# Patient Record
(demographics unavailable — no encounter records)

---

## 2025-03-22 NOTE — HISTORY OF PRESENT ILLNESS
[Mother] : mother [Cow's milk (Ounces per day ___)] : consumes [unfilled] oz of cow's milk per day [Fruit] : fruit [Vegetables] : vegetables [Meat] : meat [Cereal] : cereal [Eggs] : eggs [Table food] : table food [Normal] : Normal [Brushing teeth] : Brushing teeth [Toothpaste] : Primary Fluoride Source: Toothpaste [Playtime] : Playtime [No] : No cigarette smoke exposure [Water heater temperature set at <120 degrees F] : Water heater temperature set at <120 degrees F [Car seat in back seat] : Car seat in back seat [Carbon Monoxide Detectors] : Carbon monoxide detectors [Smoke Detectors] : Smoke detectors [FreeTextEntry7] : 15 month [facility] visit  [de-identified] : Dry skin on face arms and legs

## 2025-03-22 NOTE — DEVELOPMENTAL MILESTONES
[Normal Development] : Normal Development [Imitates scribbling] : imitates scribbling [Drinks from cup with little] : drinks from cup with little spilling [Uses 3 words other than names] : uses 3 words other than names [Speaks in sounds that seem like] : speaks in sounds that seem like an unknown language [Follows directions that do not] : follows direction that do not include a gesture [Looks when parent says,] : looks when parent says, "Where is...?" [Squats to  objects] : squats to  objects [Crawls up a few steps] : crawls up a few steps [Begins to run] : begins to run [Drops object into and takes object] : drops object into and takes object out of container

## 2025-03-22 NOTE — DISCUSSION/SUMMARY
[Communication and Social Development] : communication and social development [Sleep Routines and Issues] : sleep routines and issues [Temper Tantrums and Discipline] : temper tantrums and discipline [Healthy Teeth] : healthy teeth [Safety] : safety [] : The components of the vaccine(s) to be administered today are listed in the plan of care. The disease(s) for which the vaccine(s) are intended to prevent and the risks have been discussed with the caretaker.  The risks are also included in the appropriate vaccination information statements which have been provided to the patient's caregiver.  The caregiver has given consent to vaccinate. [FreeTextEntry1] : 15 month old for WCC visit. Well appearing child on exam in no acute distress. Age-appropriate anticipatory guidance provided.   -Vaccines as ordered. Flu vaccine declined today by mother  -RTC for next WCC visit/sooner if any concerns arise

## 2025-03-22 NOTE — PHYSICAL EXAM
[Alert] : alert [No Acute Distress] : no acute distress [Normocephalic] : normocephalic [Anterior Nitro Closed] : anterior fontanelle closed [Red Reflex Bilateral] : red reflex bilateral [PERRL] : PERRL [Normally Placed Ears] : normally placed ears [Auricles Well Formed] : auricles well formed [No Discharge] : no discharge [Nares Patent] : nares patent [Palate Intact] : palate intact [Uvula Midline] : uvula midline [Tooth Eruption] : tooth eruption  [Supple, full passive range of motion] : supple, full passive range of motion [No Palpable Masses] : no palpable masses [Symmetric Chest Rise] : symmetric chest rise [Clear to Auscultation Bilaterally] : clear to auscultation bilaterally [Regular Rate and Rhythm] : regular rate and rhythm [S1, S2 present] : S1, S2 present [No Murmurs] : no murmurs [+2 Femoral Pulses] : +2 femoral pulses [Soft] : soft [NonTender] : non tender [Non Distended] : non distended [Normoactive Bowel Sounds] : normoactive bowel sounds [No Hepatomegaly] : no hepatomegaly [No Splenomegaly] : no splenomegaly [Andrew 1] : Andrew 1 [No Clitoromegaly] : no clitoromegaly [Normal Vaginal Introitus] : normal vaginal introitus [Patent] : patent [Normally Placed] : normally placed [No Abnormal Lymph Nodes Palpated] : no abnormal lymph nodes palpated [No Clavicular Crepitus] : no clavicular crepitus [Symmetric Buttocks Creases] : symmetric buttocks creases [No Spinal Dimple] : no spinal dimple [NoTuft of Hair] : no tuft of hair [Cranial Nerves Grossly Intact] : cranial nerves grossly intact [No Rash or Lesions] : no rash or lesions

## 2025-04-17 NOTE — DISCUSSION/SUMMARY
[FreeTextEntry1] : 17 month old female presenting with acute bilateral conjunctivitis, most likely bacterial given presentation.   -Will treat with antibiotic eye drops -Call/RTC if new/worsening/persistent symptoms

## 2025-04-17 NOTE — HISTORY OF PRESENT ILLNESS
[de-identified] : Eye discharge [FreeTextEntry6] : 17 month old female presenting with eye discharge that started 2 days ago. Initially with eye redness and pus like discharge in left eye. Today, noted to have eye redness and pus like discharge in both eyes.  No fevers, cough, congestion, vomiting, diarrhea, rash reported.

## 2025-04-28 NOTE — HISTORY OF PRESENT ILLNESS
[de-identified] : fever, URI symptoms [FreeTextEntry6] : Mother reports that Sarah has had cough for a couple weeks now with runny/stuffy nose which she feels worsened over the past 2-3 days. Last night, mother notes that Sarah had a fever, Tmax 103.2F. Eating/drinking well. Voiding appropriately, normal activity level. No known sick contacts. No vomiting, diarrhea, rash.

## 2025-04-28 NOTE — DISCUSSION/SUMMARY
[FreeTextEntry1] : --- Sarah is a 17 month old female with left-sided AOM in the setting of mild URI symptoms. She is well-perfused, without acute respiratory distress, and in no acute distress on exam. Rapid influenza and COVID-19 tests were negative.  - Start high-dose amoxicillin twice per day for 10 days. - Discussed supportive care: rest, hydration, nasal saline irrigation, humidification, honey. - Discussed OTC antipyretics/analgesics PRN. - Discussed ED/return precautions. - Mother acknowledged understanding plan and demonstrated return.

## 2025-04-28 NOTE — PHYSICAL EXAM
[Alert] : alert [Consolable] : consolable [Normocephalic] : normocephalic [EOMI] : grossly EOMI [Erythema] : erythema [Bulging] : bulging [Pink Nasal Mucosa] : pink nasal mucosa [Clear Rhinorrhea] : clear rhinorrhea [Supple] : supple [Symmetric Chest Wall] : symmetric chest wall [Clear to Auscultation Bilaterally] : clear to auscultation bilaterally [Regular Rate and Rhythm] : regular rate and rhythm [Normal S1, S2 audible] : normal S1, S2 audible [Soft] : soft [No Abnormal Lymph Nodes Palpated] : no abnormal lymph nodes palpated [Moves All Extremities x 4] : moves all extremities x4 [Warm, Well Perfused x4] : warm, well perfused x4 [Capillary Refill <2s] : capillary refill < 2s [Warm] : warm [Acute Distress] : no acute distress [Tired appearing] : not tired appearing [Lethargic] : not lethargic [Irritable] : not irritable [Toxic] : not toxic [Conjuctival Injection] : no conjunctival injection [Clear] : left tympanic membrane not clear [Wheezing] : no wheezing [Rales] : no rales [Crackles] : no crackles [Transmitted Upper Airway Sounds] : no transmitted upper airway sounds [Tachypnea] : no tachypnea [Rhonchi] : no rhonchi [Belly Breathing] : no belly breathing [Subcostal Retractions] : no subcostal retractions [Suprasternal Retractions] : no suprasternal retractions [Distended] : nondistended

## 2025-06-21 NOTE — DEVELOPMENTAL MILESTONES
[Engages with others for play] : engages with others for play [Help dress and undress self] : help dress and undress self [Points to pictures in book] : points to pictures in book [Points to object of interest to] : points to object of interest to draw attention to it [Turns and looks at adult if] : turns and looks at adult if something new happens [Uses 6 to 10 words other than] : uses 6 to 10 words other than names [Identifies at least 2 body parts] : identifies at least 2 body parts [Walks up with 2 feet per step] : walks up with 2 feet per step with hand held [Sits in small chair] : sits in small chair [Carries toy while walking] : carries toy while walking [Throws small ball a few feet] : throws a small ball a few feet while standing [Passed] : passed [Yes] : Completed. [Normal Development] : Normal Development

## 2025-06-21 NOTE — PHYSICAL EXAM
[Alert] : alert [No Acute Distress] : no acute distress [Normocephalic] : normocephalic [Anterior [place] Closed] : anterior fontanelle closed [Red Reflex Bilateral] : red reflex bilateral [PERRL] : PERRL [Normally Placed Ears] : normally placed ears [Auricles Well Formed] : auricles well formed [No Discharge] : no discharge [Nares Patent] : nares patent [Palate Intact] : palate intact [Uvula Midline] : uvula midline [Tooth Eruption] : tooth eruption  [Supple, full passive range of motion] : supple, full passive range of motion [No Palpable Masses] : no palpable masses [Symmetric Chest Rise] : symmetric chest rise [Clear to Auscultation Bilaterally] : clear to auscultation bilaterally [Regular Rate and Rhythm] : regular rate and rhythm [S1, S2 present] : S1, S2 present [No Murmurs] : no murmurs [+2 Femoral Pulses] : +2 femoral pulses [Soft] : soft [NonTender] : non tender [Non Distended] : non distended [Normoactive Bowel Sounds] : normoactive bowel sounds [No Hepatomegaly] : no hepatomegaly [No Splenomegaly] : no splenomegaly [Andrew 1] : Andrew 1 [No Clitoromegaly] : no clitoromegaly [Normal Vaginal Introitus] : normal vaginal introitus [Patent] : patent [Normally Placed] : normally placed [No Abnormal Lymph Nodes Palpated] : no abnormal lymph nodes palpated [No Clavicular Crepitus] : no clavicular crepitus [Symmetric Buttocks Creases] : symmetric buttocks creases [No Spinal Dimple] : no spinal dimple [NoTuft of Hair] : no tuft of hair [Cranial Nerves Grossly Intact] : cranial nerves grossly intact [No Rash or Lesions] : no rash or lesions

## 2025-06-21 NOTE — DISCUSSION/SUMMARY
[Family Support] : family support [Child Development and Behavior] : child development and behavior [Language Promotion/Hearing] : language promotion/hearing [[place] Training Readiness] : toliet training readiness [Safety] : safety [] : The components of the vaccine(s) to be administered today are listed in the plan of care. The disease(s) for which the vaccine(s) are intended to prevent and the risks have been discussed with the caretaker.  The risks are also included in the appropriate vaccination information statements which have been provided to the patient's caregiver.  The caregiver has given consent to vaccinate. [FreeTextEntry1] :  18 month old for WCC visit. Well appearing child on exam in no acute distress. Age-appropriate anticipatory guidance provided. Growth and development appropriate for age  -Vaccines as ordered: Hep A #2 -RTC for next WCC visit/sooner if any concerns arise

## 2025-06-21 NOTE — HISTORY OF PRESENT ILLNESS
[Mother] : mother [Cow's milk (Ounces per day ___)] : consumes [unfilled] oz of Cow's milk per day [Fruit] : fruit [Vegetables] : vegetables [Meat] : meat [Cereal] : cereal [Eggs] : eggs [Table food] : table food [Normal] : Normal [Brushing teeth] : Brushing teeth [Toothpaste] : Primary Fluoride Source: Toothpaste [Playtime] : Playtime  [Temper Tantrums] : Temper Tantrums [No] : No cigarette smoke exposure [Water heater temperature set at <120 degrees F] : Water heater temperature set at <120 degrees F [Car seat in back seat] : Car seat in back seat [Carbon Monoxide Detectors] : Carbon monoxide detectors [Smoke Detectors] : Smoke detectors [FreeTextEntry7] : [facility] visit

## 2025-07-05 NOTE — DISCUSSION/SUMMARY
[FreeTextEntry1] : .dry skin - moisturize / Aveeno baby eczema cream Bid  small , freely mobile occipital lymph nodes .   reassurance . will observe.   any changes, we will reassess.

## 2025-07-05 NOTE — HISTORY OF PRESENT ILLNESS
[FreeTextEntry6] : patient is here today because mom found a bump on her scalp. she has been scratching her head and has been sweaty in the hot  weather.   there has been no n/v/d/rash /fever or cough no medication has been given or applied.

## 2025-07-05 NOTE — PHYSICAL EXAM
[Occipital] : occipital [NL] : moves all extremities x4, warm, well perfused x4 [de-identified] : freely mobile  pea sized nodes b/l [de-identified] : dry patches behind both pinnae